# Patient Record
(demographics unavailable — no encounter records)

---

## 2024-12-12 NOTE — PHYSICAL EXAM
[Alert] : alert [Well Nourished] : well nourished [No Acute Distress] : no acute distress [EOMI] : extra ocular movement intact [Normal Hearing] : hearing was normal [No Respiratory Distress] : no respiratory distress [Normal Rate] : heart rate was normal

## 2024-12-12 NOTE — ASSESSMENT
[Diabetes Foot Care] : diabetes foot care [Long Term Vascular Complications] : long term vascular complications of diabetes [Carbohydrate Consistent Diet] : carbohydrate consistent diet [Importance of Diet and Exercise] : importance of diet and exercise to improve glycemic control, achieve weight loss and improve cardiovascular health [Exercise/Effect on Glucose] : exercise/effect on glucose [Self Monitoring of Blood Glucose] : self monitoring of blood glucose [Retinopathy Screening] : Patient was referred to ophthalmology for retinopathy screening [FreeTextEntry1] : 58 year old male with PMH of Type 2 DM since ~2010, recent granulomatous disease, HTN, depression, ADHD, BMI 36 is presenting for follow up care for his Diabetes.  1. Type 2 diabetes mellitus. Point-of-care HbA1c 9.3% July 2023 --> 8.9% Sept 2023 --> 7.3% Jan 2024 --> 7.1% Aug 2024  -We discussed the cardiovascular and microvascular complications of uncontrolled diabetes. We discussed the importance of diet and exercise and lifestyle modification for glycemic control and weight loss. We discussed referral to our diabetes educator and nutrition. We discussed pharmacologic options for glycemic control and weight loss. -Reduce Metformin 500mg 1 tab BID. No GI symptoms. -Discontinue Farxiga 5mg QD (No UTI).  -Increase Trulicity 3mg weekly. No h/o MTC, MEN2 or pancreatitis. GI s/e discussed.  -LDL above goal, would start statin and refer to Cardio. -Urine ACR needed. Order placed.  -Opthal UTD  Patient verbalized understanding of the above. All questions were answered to patient's satisfaction. Dispo: Patient will follow up in 4 months.

## 2024-12-12 NOTE — HISTORY OF PRESENT ILLNESS
[FreeTextEntry1] :  is presenting for follow up care for his Diabetes.  Works as .   PCP: Dr. Marin  58 year old male with PMH of Type 2 DM since ~2010, recent granulomatous disease, HTN, depression, ADHD, BMI 36. A1c has been as high as 10% in past.   Reports no microvascular complications, no history of retinopathy, nephropathy. Has b/l neuropathy.  Reports no history of cardiovascular risk factors, no history of CAD, CHF or CVA in the past.   Current DM meds: Metformin 500mg 2 tabs BID (2 months ago increased), Farxiga 5mg QD (No UTI), Trulicity 1.5mg Prior DM meds: Xigduo - Anayeli (had UTI March 2023) Other pertinent meds:   POCT A1c%: 9.3% July 2023 --> 8.9% Sept 2023 --> 7.3% Jan 2024 --> 7.1% Aug 2024  POCT BG:   FSG: Has not checked in last 2 months.  Pre-Breakfast:  Pre-Lunch:  Pre-Dinner: 130s, 150s Bedtime:  Hypoglycemic episodes:   Diet:  Breakfast: Yogurt or pop tarts.  Lunch: Chipotle or chick file.  Dinner: protein with minimal vegetables. Rice or potatoes.  Snacks: Juice or regular soda on occasion twice a week. Fruit: Apples, oranges, bananas, grapes.   Exercise: None. Walks with dog.  Urine micro: needs ACE: C-peptide:  Cr: 1.1 GFR: 78 Sept 2023  Lipid profile:  TGs 238 Sept 2023 Statin: None  HbA1c%: 7.1% Ophthalmology: 2023, wnl. No DR.  Neuropathy: b/l feet Podiatry/Diabetic foot exam:   Interval hx:  Patient not taking farxiga got 4-5 months.  Unable to obtain trulicity for 3 months, injected 1.5mg twice but had side effects. Then restarted 1.5mg trulicity.  Pt weighs 260.

## 2024-12-23 NOTE — ASSESSMENT
[Diabetes Foot Care] : diabetes foot care [Long Term Vascular Complications] : long term vascular complications of diabetes [Carbohydrate Consistent Diet] : carbohydrate consistent diet [Importance of Diet and Exercise] : importance of diet and exercise to improve glycemic control, achieve weight loss and improve cardiovascular health [Exercise/Effect on Glucose] : exercise/effect on glucose [Self Monitoring of Blood Glucose] : self monitoring of blood glucose [Retinopathy Screening] : Patient was referred to ophthalmology for retinopathy screening [FreeTextEntry1] : 58 year old male with PMH of Type 2 DM since ~2010, recent granulomatous disease, HTN, depression, ADHD, BMI 36 is presenting for follow up care for his Diabetes.  1. Type 2 diabetes mellitus. Point-of-care HbA1c 9.3% July 2023 --> 8.9% Sept 2023 --> 7.3% Jan 2024 --> 7.1% Aug 2024 --> 6.9% Dec 2024.  -We discussed the cardiovascular and microvascular complications of uncontrolled diabetes. We discussed the importance of diet and exercise and lifestyle modification for glycemic control and weight loss. We discussed referral to our diabetes educator and nutrition. We discussed pharmacologic options for glycemic control and weight loss. -Continue Metformin 500mg 1 tab BID. No GI symptoms. -Continue Trulicity 3mg weekly. No h/o MTC, MEN2 or pancreatitis. GI s/e discussed.  -LDL above goal, would start statin and refer to Cardio. -Urine ACR needed. Order placed.  -Opthal UTD  2. Hypokalemia despite valsartan.  Check renin and aldosterone.   Patient verbalized understanding of the above. All questions were answered to patient's satisfaction. Dispo: Patient will follow up in 4 months.

## 2024-12-23 NOTE — HISTORY OF PRESENT ILLNESS
[FreeTextEntry1] :  is presenting for follow up care for his Diabetes.  Works as .   PCP: Dr. Marin  58 year old male with PMH of Type 2 DM since ~2010, recent granulomatous disease, HTN, depression, ADHD, BMI 36. A1c has been as high as 10% in past.   Reports no microvascular complications, no history of retinopathy, nephropathy. Has b/l neuropathy.  Reports no history of cardiovascular risk factors, no history of CAD, CHF or CVA in the past.   Current DM meds: Metformin 500mg 1 tab BID,  (No UTI), Trulicity 3mg Prior DM meds: Xigduo - Jardiance (had UTI March 2023), Farxiga 5mg QD.  Other pertinent meds:   POCT A1c%: 9.3% July 2023 --> 8.9% Sept 2023 --> 7.3% Jan 2024 --> 7.1% Aug 2024 --> 6.9% Dec 2024  POCT BG:   FSG: Has not checked in last 2 months.  Pre-Breakfast:  Pre-Lunch:  Pre-Dinner: 130s, 150s Bedtime:  Hypoglycemic episodes:   Diet:  Breakfast: Yogurt or pop tarts.  Lunch: Chipotle or chick file.  Dinner: protein with minimal vegetables. Rice or potatoes.  Snacks: Juice or regular soda on occasion twice a week. Fruit: Apples, oranges, bananas, grapes.   Exercise: None. Walks with dog.  Urine micro: needs ACE: C-peptide:  Cr: 1.1 GFR: 78 Sept 2023  Lipid profile:  TGs 238 Sept 2023 Statin: None  HbA1c%: 7.1% Ophthalmology: 2023, wnl. No DR.  Neuropathy: b/l feet Podiatry/Diabetic foot exam:   Interval hx:  Patient not taking farxiga got 4-5 months.  Jan 2nd, rotater cuff surgery.  Unable to obtain trulicity for 3 months, injected 1.5mg twice but had side effects. Then restarted 1.5mg trulicity.  Pt weighs 260.

## 2024-12-23 NOTE — REASON FOR VISIT
[Home] : at home, [unfilled] , at the time of the visit. [Other Location: e.g. Home (Enter Location, City,State)___] : at [unfilled] [Patient] : the patient [Follow - Up] : a follow-up visit [DM Type 2] : DM Type 2